# Patient Record
Sex: FEMALE | Race: WHITE | NOT HISPANIC OR LATINO | URBAN - METROPOLITAN AREA
[De-identification: names, ages, dates, MRNs, and addresses within clinical notes are randomized per-mention and may not be internally consistent; named-entity substitution may affect disease eponyms.]

---

## 2018-04-30 ENCOUNTER — AMBULATORY - HEALTHEAST (OUTPATIENT)
Dept: ADMINISTRATIVE | Facility: CLINIC | Age: 83
End: 2018-04-30

## 2018-04-30 ENCOUNTER — OFFICE VISIT - HEALTHEAST (OUTPATIENT)
Dept: GERIATRICS | Facility: CLINIC | Age: 83
End: 2018-04-30

## 2018-04-30 DIAGNOSIS — M43.26 FUSION OF SPINE OF LUMBAR REGION: ICD-10-CM

## 2018-04-30 DIAGNOSIS — M54.16 LUMBAR RADICULOPATHY: ICD-10-CM

## 2018-04-30 DIAGNOSIS — J82.89 PULMONARY EOSINOPHILIA (H): ICD-10-CM

## 2018-04-30 RX ORDER — AMOXICILLIN 250 MG
1-4 CAPSULE ORAL 2 TIMES DAILY
Status: SHIPPED | COMMUNITY
Start: 2018-04-30

## 2018-04-30 RX ORDER — LORATADINE 10 MG/1
10 TABLET ORAL DAILY PRN
Status: SHIPPED | COMMUNITY
Start: 2018-04-30

## 2018-04-30 RX ORDER — HYDROCODONE BITARTRATE AND ACETAMINOPHEN 5; 325 MG/1; MG/1
1-2 TABLET ORAL EVERY 4 HOURS PRN
Status: SHIPPED | COMMUNITY
Start: 2018-04-30

## 2018-04-30 RX ORDER — ACETAMINOPHEN 325 MG/1
325-650 TABLET ORAL EVERY 4 HOURS PRN
Status: SHIPPED | COMMUNITY
Start: 2018-04-30

## 2018-05-03 ENCOUNTER — OFFICE VISIT - HEALTHEAST (OUTPATIENT)
Dept: GERIATRICS | Facility: CLINIC | Age: 83
End: 2018-05-03

## 2018-05-03 DIAGNOSIS — H40.9 GLAUCOMA: ICD-10-CM

## 2018-05-03 DIAGNOSIS — G62.9 PERIPHERAL NEUROPATHY: ICD-10-CM

## 2018-05-03 DIAGNOSIS — Z98.1 S/P LUMBAR FUSION: ICD-10-CM

## 2018-05-03 DIAGNOSIS — K21.9 GERD (GASTROESOPHAGEAL REFLUX DISEASE): ICD-10-CM

## 2018-05-07 ENCOUNTER — AMBULATORY - HEALTHEAST (OUTPATIENT)
Dept: GERIATRICS | Facility: CLINIC | Age: 83
End: 2018-05-07

## 2021-05-05 ENCOUNTER — RECORDS - HEALTHEAST (OUTPATIENT)
Dept: LAB | Facility: CLINIC | Age: 86
End: 2021-05-05

## 2021-05-05 LAB
ERYTHROCYTE [DISTWIDTH] IN BLOOD BY AUTOMATED COUNT: 13.2 % (ref 11–14.5)
HCT VFR BLD AUTO: 28.3 % (ref 35–47)
HGB BLD-MCNC: 9.2 G/DL (ref 12–16)
MCH RBC QN AUTO: 28.8 PG (ref 27–34)
MCHC RBC AUTO-ENTMCNC: 32.5 G/DL (ref 32–36)
MCV RBC AUTO: 89 FL (ref 80–100)
PLATELET # BLD AUTO: 416 THOU/UL (ref 140–440)
PMV BLD AUTO: 10.4 FL (ref 8.5–12.5)
RBC # BLD AUTO: 3.19 MILL/UL (ref 3.8–5.4)
WBC: 11.4 THOU/UL (ref 4–11)

## 2021-06-17 NOTE — PROGRESS NOTES
[facility]      Facility:    [facility] [[id number]]  Code Status: FULL CODE      Chief Complaint/Reason for Visit:  Chief Complaint   Patient presents with     H & P       HPI:   Ana is a 82 y.o. female who recently has undergone lumbar spine surgery involving L4-5 and L5-S1 including removal of some of the previous fusion components and replacing some screws as well.  She has been placed on a very restrictive activity level of no lifting greater than 5 pounds and to avoid any bending stooping or twisting motions of her body.  Her temperature spiked 1 day to 101.1 in the hospital.  She does have a history of pulmonary eosinophilia, but states that it is not been giving her any problems lately and she has not been following with a pulmonologist about this.  She does have a history of positive mental and so a chest x-ray will be done for screening upon admission.    Upon current review of systems, she does not have any fevers or chills, she does not have any sore throat or nasal congestion, no cough or shortness of breath or chest pain or abdominal pain or nausea or bowel problems and no dysuria.  No headache.    Past Medical History:  Past Medical History:   Diagnosis Date     Asthma      Chronic sinusitis      GERD (gastroesophageal reflux disease)      Glaucoma      HTN (hypertension)      Hypercholesteremia      Hyperlipemia      Iron deficiency anemia      LVH (left ventricular hypertrophy)      Major depression      Mastoiditis      Orthostatic hypotension      Osteoarthrosis      Peripheral neuropathy      Pulmonary eosinophilia      PVC (premature ventricular contraction)      Sleep apnea      Subluxation     of L4-L5 lumbar vertebra     Syncope and collapse      Vasovagal syncope            Surgical History:  Past Surgical History:   Procedure Laterality Date     anal spincterotomy  1996     APPENDECTOMY       BREAST SURGERY Left     lumpectomy     CATARACT  EXTRACTION Bilateral     1997 & 2005     colonoscopy  01/2010    diagnostoc, Polyp-f/up conoscopy rec in 5 years     hammer toe repair  Left 2001     HYSTERECTOMY       LAPAROSCOPIC CHOLECYSTECTOMY  10/24/2016     left foot Left 10/2012    remove hardware     LUMBAR FUSION  02/2015     ORIF FOOT FRACTURE Left     5th metatarsal     PICC  2/5/2015          Right L5-S1 transforaminal interbody fusion removal & replacement of screws at L4-L5       SINUS SURGERY  1988     TRABECULECTOMY Bilateral     2005 & 2010 bilateral- for glaucoma       Family History:   Family History   Problem Relation Age of Onset     Cancer Mother      Alcohol abuse Father      Drug abuse Father      Heart disease Father      Alcohol abuse Sister      Drug abuse Sister      Diabetes Sister      Asthma Sister      Alcohol abuse Brother      Drug abuse Brother      Cancer Maternal Grandfather      Stroke Paternal Grandmother      Asthma Child      Cancer Maternal Aunt        Social History:    Social History     Social History     Marital status:      Spouse name: N/A     Number of children: N/A     Years of education: N/A     Social History Main Topics     Smoking status: Never Smoker     Smokeless tobacco: Never Used     Alcohol use Yes     Drug use: Not on file     Sexual activity: Not on file     Other Topics Concern     Not on file     Social History Narrative          Review of Systems   All other systems reviewed and are negative.      Vitals:    04/30/18 0610   BP: 129/78   Pulse: 87   Resp: 16   Temp: 98.1  F (36.7  C)   SpO2: 97%       Physical Exam   Constitutional: She is oriented to person, place, and time. No distress.   HENT:   Mouth/Throat: Oropharynx is clear and moist.   Eyes: Right eye exhibits no discharge. Left eye exhibits no discharge.   Neck: No thyromegaly present.   Cardiovascular: Normal rate, regular rhythm and normal heart sounds.    Pulmonary/Chest: Breath sounds normal. No respiratory distress. She has no  wheezes.   Abdominal: Soft. Bowel sounds are normal. She exhibits no distension. There is no tenderness.   Musculoskeletal: She exhibits no edema.   Lymphadenopathy:     She has no cervical adenopathy.   Neurological: She is alert and oriented to person, place, and time.   Skin: Skin is warm and dry.   Psychiatric: She has a normal mood and affect. Her behavior is normal.   Nursing note and vitals reviewed.      Medication List:  Current Outpatient Prescriptions   Medication Sig     acetaminophen (TYLENOL) 325 MG tablet Take 325-650 mg by mouth every 4 (four) hours as needed for pain.     albuterol (PROVENTIL HFA;VENTOLIN HFA) 90 mcg/actuation inhaler Inhale 2 puffs 4 (four) times a day as needed for wheezing.      amLODIPine (NORVASC) 10 MG tablet Take 5 mg by mouth daily.      bimatoprost (LUMIGAN) 0.01 % Drop Administer 1 drop to both eyes bedtime.      bisacodyl (DULCOLAX) 10 mg suppository Insert 10 mg into the rectum daily as needed.     budesonide-formoterol (SYMBICORT) 160-4.5 mcg/actuation inhaler Inhale 2 puffs 2 (two) times a day.     gabapentin (NEURONTIN) 300 MG capsule Take 900 mg by mouth at bedtime.      gabapentin (NEURONTIN) 300 MG capsule Take 300 mg by mouth daily with supper.      HYDROcodone-acetaminophen 5-325 mg per tablet Take 1-2 tablets by mouth every 4 (four) hours as needed for pain.     loratadine (CLARITIN) 10 mg tablet Take 10 mg by mouth daily as needed for allergies.     mometasone (NASONEX) 50 mcg/actuation nasal spray 2 sprays into each nostril daily.      pantoprazole (PROTONIX) 40 MG tablet Take 40 mg by mouth 2 (two) times a day.     ranitidine (ZANTAC) 300 MG tablet Take 300 mg by mouth bedtime.     senna-docusate (SENEXON-S) 8.6-50 mg tablet Take 1-4 tablets by mouth 2 (two) times a day.       Labs:  No new laboratory testing    Assessment:    ICD-10-CM    1. Fusion of spine of lumbar region M43.26    2. Lumbar radiculopathy M54.16    3. Pulmonary eosinophilia J82         Plan:  Encouragement given for anticipated activities of therapies under the guidance and limitations of her surgeon.      Electronically signed by: Rafael Sandoval MD

## 2021-06-17 NOTE — PROGRESS NOTES
[facility]    Facility:   [facility] [[id number]]   Code Status: FULL CODE  PCP: Araceli Monroy MD   Phone: [phone]   Fax: [phone]      CHIEF COMPLAINT/REASON FOR VISIT:  Chief Complaint   Patient presents with     Discharge Summary       HISTORY COURSE:  Ana is a 82 y.o. female who recently has undergone lumbar spine surgery involving L4-5 and L5-S1 including removal of some of the previous fusion components and replacing some screws as well.  She has been placed on a very restrictive activity level of no lifting greater than 5 pounds and to avoid any bending stooping or twisting motions of her body.  Her temperature spiked 1 day to 101.1 in the hospital.  She does have a history of pulmonary eosinophilia, but states that it is not been giving her any problems lately and she has not been following with a pulmonologist about this.  She does have a history of positive mental and so a chest x-ray will be done for screening upon admission.  She has been doing well from a therapy standpoint at this point she does feel comfortable upon going home and managing her cares.  She at this point will also not receive any extra services.  Her pain is well managed she does take Vicodin as needed usually 3 or 4 doses per day.  She does have sutures in the lumbar area the area actually looks pretty good so we will remove the sutures tomorrow her follow-up with the surgeon apparently is not until a couple more weeks and she is not sure when she will be seeing her family practice doctor for follow-up but she should be following up within a week or so per our recommendation.  Otherwise she has been normotensive and afebrile and also on room air.  No numbness or tingling lower extremities.  Able to be independent and self-sufficient in her room as well as transfers.    Review of Systems  Currently denies chills fever coughing wheezing chest pain dizziness or vertigo nausea  vomiting diarrhea dysuria headache stiff neck or swollen glands.  History of asthma glaucoma hypertension GERD lumbar degenerative disc disease  Vitals:    05/03/18 0925   BP: 129/72   Pulse: 83   Resp: 16   Temp: 98  F (36.7  C)       Physical Exam   Constitutional: She is oriented to person, place, and time. No distress.   HENT:   Head: Normocephalic.   Eyes: Pupils are equal, round, and reactive to light.   Neck: Neck supple. No thyromegaly present.   Cardiovascular: Normal rate, regular rhythm and normal heart sounds.    Pulmonary/Chest: Breath sounds normal.   Abdominal: Bowel sounds are normal. There is no tenderness. There is no guarding.   Musculoskeletal:   History of lumbar degenerative disc disease.  History of L5-S1 transforaminal interbody fusion with removal and replacement screws L4-5   Lymphadenopathy:     She has no cervical adenopathy.   Neurological: She is oriented to person, place, and time.   Skin: Skin is warm and dry. No rash noted.   Psychiatric: Her behavior is normal.     Lab Results   Component Value Date    WBC 7.6 02/08/2015    HGB 8.8 (L) 02/08/2015    HCT 26.5 (L) 02/08/2015    MCV 88 02/08/2015     02/08/2015     Results for orders placed or performed during the hospital encounter of 02/02/15   Basic Metabolic Panel   Result Value Ref Range    Sodium 135 (L) 136 - 145 mmol/L    Potassium 3.6 3.5 - 5.0 mmol/L    Chloride 103 98 - 107 mmol/L    CO2 21 (L) 22 - 31 mmol/L    Anion Gap, Calculation 11 5 - 18 mmol/L    Glucose 89 70 - 125 mg/dL    Calcium 9.0 8.5 - 10.5 mg/dL    BUN 12 8 - 28 mg/dL    Creatinine 0.64 0.60 - 1.10 mg/dL    GFR MDRD Af Amer >60 >60 mL/min/1.73m2    GFR MDRD Non Af Amer >60 >60 mL/min/1.73m2           MEDICATION LIST:  Current Outpatient Prescriptions   Medication Sig     acetaminophen (TYLENOL) 325 MG tablet Take 325-650 mg by mouth every 4 (four) hours as needed for pain.     albuterol (PROVENTIL HFA;VENTOLIN HFA) 90 mcg/actuation inhaler Inhale 2  puffs 4 (four) times a day as needed for wheezing.      amLODIPine (NORVASC) 10 MG tablet Take 5 mg by mouth daily.      bimatoprost (LUMIGAN) 0.01 % Drop Administer 1 drop to both eyes bedtime.      budesonide-formoterol (SYMBICORT) 160-4.5 mcg/actuation inhaler Inhale 2 puffs 2 (two) times a day.     gabapentin (NEURONTIN) 300 MG capsule Take 900 mg by mouth at bedtime.      gabapentin (NEURONTIN) 300 MG capsule Take 300 mg by mouth daily with supper.      HYDROcodone-acetaminophen 5-325 mg per tablet Take 1-2 tablets by mouth every 4 (four) hours as needed for pain.     loratadine (CLARITIN) 10 mg tablet Take 10 mg by mouth daily as needed for allergies.     mometasone (NASONEX) 50 mcg/actuation nasal spray 2 sprays into each nostril daily.      pantoprazole (PROTONIX) 40 MG tablet Take 40 mg by mouth 2 (two) times a day.     ranitidine (ZANTAC) 300 MG tablet Take 300 mg by mouth bedtime.     senna-docusate (SENEXON-S) 8.6-50 mg tablet Take 1-4 tablets by mouth 2 (two) times a day.       DISCHARGE DIAGNOSIS:    ICD-10-CM    1. S/P lumbar fusion Z98.1    2. Peripheral neuropathy G62.9    3. Glaucoma H40.9    4. GERD (gastroesophageal reflux disease) K21.9        MEDICAL EQUIPMENT NEEDS:  Walker    DISCHARGE PLAN/FACE TO FACE:  I certify that services are/were furnished while this patient was under the care of a physician and that a physician or an allowed non-physician practitioner (NPP), had a face-to-face encounter that meets the physician face-to-face encounter requirements. The encounter was in whole, or in part, related to the primary reason for home health. The patient is confined to his/her home and needs intermittent skilled nursing, physical therapy, speech-language pathology, or the continued need for occupational therapy. A plan of care has been established by a physician and is periodically reviewed by a physician.  Date of Face-to-Face Encounter: May 3, 2018    I certify that, based on my findings,  the following services are medically necessary home health services: She will be discharging home with current medications and narcotics and will not receive any specific services upon her discharge date of May 4, 2018    My clinical findings support the need for the above skilled services because: (Please write a brief narrative summary that describes what the RN, PT, SLP, or other services will be doing in the home. A list of diagnoses in this section does not meet the CMS requirements.)  She will not require any skilled services    This patient is homebound because: (Please write a brief narrative summary describing the functional limitations as to why this patient is homebound and specifically what makes this patient homebound.)  She will not be homebound    The patient is, or has been, under my care and I have initiated the establishment of the plan of care. This patient will be followed by a physician who will periodically review the plan of care.    Schedule follow up visit with primary care provider within 7 days to reestablish care.  She will be following up with the surgeon towards the end of May or beginning of June she is not sure of the date yet.  She will follow-up with her primary care doctor regarding medication management future laboratory studies and certainly looking at rechecking her hemoglobin.  We will remove the sutures tomorrow.  She at home does not use a walker here she has been she may want to transition to a walker she is not sure but she also in the past has used a cane but did not care for the cane.  She will consider her options upon discharge.    Discharge coordination care greater than 30 minutes.  Electronically signed by: Michael Duane Johnson, CNP

## 2023-02-08 ENCOUNTER — LAB REQUISITION (OUTPATIENT)
Dept: LAB | Facility: CLINIC | Age: 88
End: 2023-02-08

## 2023-02-08 DIAGNOSIS — I10 ESSENTIAL (PRIMARY) HYPERTENSION: ICD-10-CM

## 2023-02-08 LAB
ANION GAP SERPL CALCULATED.3IONS-SCNC: 22 MMOL/L (ref 7–15)
BUN SERPL-MCNC: 19 MG/DL (ref 8–23)
CALCIUM SERPL-MCNC: 9 MG/DL (ref 8.8–10.2)
CHLORIDE SERPL-SCNC: 105 MMOL/L (ref 98–107)
CREAT SERPL-MCNC: 1.27 MG/DL (ref 0.51–0.95)
DEPRECATED CALCIDIOL+CALCIFEROL SERPL-MC: 29 UG/L (ref 20–75)
DEPRECATED HCO3 PLAS-SCNC: 16 MMOL/L (ref 22–29)
ERYTHROCYTE [DISTWIDTH] IN BLOOD BY AUTOMATED COUNT: 13.7 % (ref 10–15)
GFR SERPL CREATININE-BSD FRML MDRD: 41 ML/MIN/1.73M2
GLUCOSE SERPL-MCNC: 110 MG/DL (ref 70–99)
HCT VFR BLD AUTO: 28.6 % (ref 35–47)
HGB BLD-MCNC: 8.9 G/DL (ref 11.7–15.7)
MCH RBC QN AUTO: 28.2 PG (ref 26.5–33)
MCHC RBC AUTO-ENTMCNC: 31.1 G/DL (ref 31.5–36.5)
MCV RBC AUTO: 91 FL (ref 78–100)
PLATELET # BLD AUTO: 476 10E3/UL (ref 150–450)
POTASSIUM SERPL-SCNC: 5.1 MMOL/L (ref 3.4–5.3)
RBC # BLD AUTO: 3.16 10E6/UL (ref 3.8–5.2)
SODIUM SERPL-SCNC: 143 MMOL/L (ref 136–145)
WBC # BLD AUTO: 10.3 10E3/UL (ref 4–11)

## 2023-02-08 PROCEDURE — 85027 COMPLETE CBC AUTOMATED: CPT | Performed by: NURSE PRACTITIONER

## 2023-02-08 PROCEDURE — 80048 BASIC METABOLIC PNL TOTAL CA: CPT | Performed by: NURSE PRACTITIONER

## 2023-02-08 PROCEDURE — 82306 VITAMIN D 25 HYDROXY: CPT | Performed by: NURSE PRACTITIONER

## 2023-06-01 ENCOUNTER — LAB REQUISITION (OUTPATIENT)
Dept: LAB | Facility: CLINIC | Age: 88
End: 2023-06-01

## 2023-06-01 DIAGNOSIS — E11.9 TYPE 2 DIABETES MELLITUS WITHOUT COMPLICATIONS (H): ICD-10-CM

## 2023-06-01 LAB
ANION GAP SERPL CALCULATED.3IONS-SCNC: 18 MMOL/L (ref 7–15)
BUN SERPL-MCNC: 37.2 MG/DL (ref 8–23)
CALCIUM SERPL-MCNC: 9 MG/DL (ref 8.8–10.2)
CHLORIDE SERPL-SCNC: 106 MMOL/L (ref 98–107)
CREAT SERPL-MCNC: 1.97 MG/DL (ref 0.51–0.95)
DEPRECATED CALCIDIOL+CALCIFEROL SERPL-MC: 35 UG/L (ref 20–75)
DEPRECATED HCO3 PLAS-SCNC: 16 MMOL/L (ref 22–29)
ERYTHROCYTE [DISTWIDTH] IN BLOOD BY AUTOMATED COUNT: 14.6 % (ref 10–15)
GFR SERPL CREATININE-BSD FRML MDRD: 24 ML/MIN/1.73M2
GLUCOSE SERPL-MCNC: 119 MG/DL (ref 70–99)
HCT VFR BLD AUTO: 29.6 % (ref 35–47)
HGB BLD-MCNC: 9 G/DL (ref 11.7–15.7)
MAGNESIUM SERPL-MCNC: 2.6 MG/DL (ref 1.7–2.3)
MCH RBC QN AUTO: 27.3 PG (ref 26.5–33)
MCHC RBC AUTO-ENTMCNC: 30.4 G/DL (ref 31.5–36.5)
MCV RBC AUTO: 90 FL (ref 78–100)
PLATELET # BLD AUTO: 539 10E3/UL (ref 150–450)
POTASSIUM SERPL-SCNC: 4.3 MMOL/L (ref 3.4–5.3)
RBC # BLD AUTO: 3.3 10E6/UL (ref 3.8–5.2)
SODIUM SERPL-SCNC: 140 MMOL/L (ref 136–145)
TSH SERPL DL<=0.005 MIU/L-ACNC: 1.59 UIU/ML (ref 0.3–4.2)
VIT B12 SERPL-MCNC: 519 PG/ML (ref 232–1245)
WBC # BLD AUTO: 13.4 10E3/UL (ref 4–11)

## 2023-06-01 PROCEDURE — 85027 COMPLETE CBC AUTOMATED: CPT | Performed by: NURSE PRACTITIONER

## 2023-06-01 PROCEDURE — 82607 VITAMIN B-12: CPT | Performed by: NURSE PRACTITIONER

## 2023-06-01 PROCEDURE — 80048 BASIC METABOLIC PNL TOTAL CA: CPT | Performed by: NURSE PRACTITIONER

## 2023-06-01 PROCEDURE — 84443 ASSAY THYROID STIM HORMONE: CPT | Performed by: NURSE PRACTITIONER

## 2023-06-01 PROCEDURE — 83036 HEMOGLOBIN GLYCOSYLATED A1C: CPT | Performed by: NURSE PRACTITIONER

## 2023-06-01 PROCEDURE — 82306 VITAMIN D 25 HYDROXY: CPT | Performed by: NURSE PRACTITIONER

## 2023-06-01 PROCEDURE — 83735 ASSAY OF MAGNESIUM: CPT | Performed by: NURSE PRACTITIONER

## 2023-06-02 ENCOUNTER — LAB REQUISITION (OUTPATIENT)
Dept: LAB | Facility: CLINIC | Age: 88
End: 2023-06-02

## 2023-06-02 DIAGNOSIS — D50.9 IRON DEFICIENCY ANEMIA, UNSPECIFIED: ICD-10-CM

## 2023-06-02 LAB
ALBUMIN UR-MCNC: 50 MG/DL
APPEARANCE UR: ABNORMAL
BACTERIA #/AREA URNS HPF: ABNORMAL /HPF
BILIRUB UR QL STRIP: NEGATIVE
COLOR UR AUTO: YELLOW
GLUCOSE UR STRIP-MCNC: NEGATIVE MG/DL
HBA1C MFR BLD: 7.4 %
HGB UR QL STRIP: NEGATIVE
HYALINE CASTS: 10 /LPF
KETONES UR STRIP-MCNC: NEGATIVE MG/DL
LEUKOCYTE ESTERASE UR QL STRIP: ABNORMAL
MUCOUS THREADS #/AREA URNS LPF: PRESENT /LPF
NITRATE UR QL: NEGATIVE
PH UR STRIP: 5 [PH] (ref 5–7)
RBC URINE: 18 /HPF
SP GR UR STRIP: 1.02 (ref 1–1.03)
SQUAMOUS EPITHELIAL: 48 /HPF
UROBILINOGEN UR STRIP-MCNC: NORMAL MG/DL
WBC CLUMPS #/AREA URNS HPF: PRESENT /HPF
WBC URINE: >182 /HPF

## 2023-06-02 PROCEDURE — 87086 URINE CULTURE/COLONY COUNT: CPT | Performed by: FAMILY MEDICINE

## 2023-06-02 PROCEDURE — 81001 URINALYSIS AUTO W/SCOPE: CPT | Performed by: FAMILY MEDICINE

## 2023-06-03 LAB — BACTERIA UR CULT: NORMAL

## 2023-06-05 ENCOUNTER — LAB REQUISITION (OUTPATIENT)
Dept: LAB | Facility: CLINIC | Age: 88
End: 2023-06-05

## 2023-06-05 DIAGNOSIS — N18.31 CHRONIC KIDNEY DISEASE, STAGE 3A (H): ICD-10-CM

## 2023-06-05 LAB
ANION GAP SERPL CALCULATED.3IONS-SCNC: 17 MMOL/L (ref 7–15)
BUN SERPL-MCNC: 50.9 MG/DL (ref 8–23)
CALCIUM SERPL-MCNC: 9.1 MG/DL (ref 8.8–10.2)
CHLORIDE SERPL-SCNC: 107 MMOL/L (ref 98–107)
CREAT SERPL-MCNC: 2.71 MG/DL (ref 0.51–0.95)
DEPRECATED HCO3 PLAS-SCNC: 15 MMOL/L (ref 22–29)
GFR SERPL CREATININE-BSD FRML MDRD: 17 ML/MIN/1.73M2
GLUCOSE SERPL-MCNC: 112 MG/DL (ref 70–99)
POTASSIUM SERPL-SCNC: 5.2 MMOL/L (ref 3.4–5.3)
SODIUM SERPL-SCNC: 139 MMOL/L (ref 136–145)

## 2023-06-05 PROCEDURE — 80048 BASIC METABOLIC PNL TOTAL CA: CPT | Performed by: FAMILY MEDICINE

## 2023-06-07 ENCOUNTER — LAB REQUISITION (OUTPATIENT)
Dept: LAB | Facility: CLINIC | Age: 88
End: 2023-06-07
Payer: COMMERCIAL

## 2023-06-07 ENCOUNTER — LAB REQUISITION (OUTPATIENT)
Dept: LAB | Facility: CLINIC | Age: 88
End: 2023-06-07

## 2023-06-07 DIAGNOSIS — I50.32 CHRONIC DIASTOLIC (CONGESTIVE) HEART FAILURE (H): ICD-10-CM

## 2023-06-07 DIAGNOSIS — N18.30 CHRONIC KIDNEY DISEASE, STAGE 3 UNSPECIFIED (H): ICD-10-CM

## 2023-06-07 LAB
ANION GAP SERPL CALCULATED.3IONS-SCNC: 16 MMOL/L (ref 7–15)
BUN SERPL-MCNC: 40.8 MG/DL (ref 8–23)
CALCIUM SERPL-MCNC: 8.6 MG/DL (ref 8.8–10.2)
CHLORIDE SERPL-SCNC: 109 MMOL/L (ref 98–107)
CREAT SERPL-MCNC: 2.03 MG/DL (ref 0.51–0.95)
DEPRECATED HCO3 PLAS-SCNC: 16 MMOL/L (ref 22–29)
ERYTHROCYTE [DISTWIDTH] IN BLOOD BY AUTOMATED COUNT: 14.9 % (ref 10–15)
GFR SERPL CREATININE-BSD FRML MDRD: 23 ML/MIN/1.73M2
GLUCOSE SERPL-MCNC: 133 MG/DL (ref 70–99)
HCT VFR BLD AUTO: 27.9 % (ref 35–47)
HGB BLD-MCNC: 8.2 G/DL (ref 11.7–15.7)
MCH RBC QN AUTO: 26.8 PG (ref 26.5–33)
MCHC RBC AUTO-ENTMCNC: 29.4 G/DL (ref 31.5–36.5)
MCV RBC AUTO: 91 FL (ref 78–100)
PLATELET # BLD AUTO: 419 10E3/UL (ref 150–450)
POTASSIUM SERPL-SCNC: 4.8 MMOL/L (ref 3.4–5.3)
RBC # BLD AUTO: 3.06 10E6/UL (ref 3.8–5.2)
SODIUM SERPL-SCNC: 141 MMOL/L (ref 136–145)
WBC # BLD AUTO: 10.5 10E3/UL (ref 4–11)

## 2023-06-07 PROCEDURE — 80048 BASIC METABOLIC PNL TOTAL CA: CPT | Performed by: FAMILY MEDICINE

## 2023-06-07 PROCEDURE — 85027 COMPLETE CBC AUTOMATED: CPT | Performed by: FAMILY MEDICINE

## 2023-06-09 ENCOUNTER — LAB REQUISITION (OUTPATIENT)
Dept: LAB | Facility: CLINIC | Age: 88
End: 2023-06-09

## 2023-06-09 DIAGNOSIS — E86.0 DEHYDRATION: ICD-10-CM

## 2023-06-09 LAB
ANION GAP SERPL CALCULATED.3IONS-SCNC: 15 MMOL/L (ref 7–15)
BUN SERPL-MCNC: 25.8 MG/DL (ref 8–23)
CALCIUM SERPL-MCNC: 8.5 MG/DL (ref 8.8–10.2)
CHLORIDE SERPL-SCNC: 113 MMOL/L (ref 98–107)
CREAT SERPL-MCNC: 1.49 MG/DL (ref 0.51–0.95)
DEPRECATED HCO3 PLAS-SCNC: 13 MMOL/L (ref 22–29)
GFR SERPL CREATININE-BSD FRML MDRD: 34 ML/MIN/1.73M2
GLUCOSE SERPL-MCNC: 106 MG/DL (ref 70–99)
POTASSIUM SERPL-SCNC: 4.2 MMOL/L (ref 3.4–5.3)
SODIUM SERPL-SCNC: 141 MMOL/L (ref 136–145)

## 2023-06-09 PROCEDURE — 80048 BASIC METABOLIC PNL TOTAL CA: CPT | Performed by: NURSE PRACTITIONER

## 2023-06-12 ENCOUNTER — LAB REQUISITION (OUTPATIENT)
Dept: LAB | Facility: CLINIC | Age: 88
End: 2023-06-12
Payer: COMMERCIAL

## 2023-06-12 DIAGNOSIS — F03.90 UNSPECIFIED DEMENTIA, UNSPECIFIED SEVERITY, WITHOUT BEHAVIORAL DISTURBANCE, PSYCHOTIC DISTURBANCE, MOOD DISTURBANCE, AND ANXIETY (H): ICD-10-CM

## 2023-06-12 DIAGNOSIS — I10 ESSENTIAL (PRIMARY) HYPERTENSION: ICD-10-CM

## 2023-06-13 LAB
ANION GAP SERPL CALCULATED.3IONS-SCNC: 10 MMOL/L (ref 7–15)
BUN SERPL-MCNC: 16 MG/DL (ref 8–23)
CALCIUM SERPL-MCNC: 8.6 MG/DL (ref 8.8–10.2)
CHLORIDE SERPL-SCNC: 116 MMOL/L (ref 98–107)
CREAT SERPL-MCNC: 1.33 MG/DL (ref 0.51–0.95)
DEPRECATED HCO3 PLAS-SCNC: 18 MMOL/L (ref 22–29)
GFR SERPL CREATININE-BSD FRML MDRD: 39 ML/MIN/1.73M2
GLUCOSE SERPL-MCNC: 96 MG/DL (ref 70–99)
POTASSIUM SERPL-SCNC: 3.7 MMOL/L (ref 3.4–5.3)
SODIUM SERPL-SCNC: 144 MMOL/L (ref 136–145)

## 2023-06-13 PROCEDURE — P9603 ONE-WAY ALLOW PRORATED MILES: HCPCS | Mod: ORL | Performed by: PHYSICIAN ASSISTANT

## 2023-06-13 PROCEDURE — 36415 COLL VENOUS BLD VENIPUNCTURE: CPT | Mod: ORL | Performed by: PHYSICIAN ASSISTANT

## 2023-06-13 PROCEDURE — 80048 BASIC METABOLIC PNL TOTAL CA: CPT | Mod: ORL | Performed by: PHYSICIAN ASSISTANT

## 2023-09-29 ENCOUNTER — LAB REQUISITION (OUTPATIENT)
Dept: LAB | Facility: CLINIC | Age: 88
End: 2023-09-29

## 2023-09-29 DIAGNOSIS — D50.9 IRON DEFICIENCY ANEMIA, UNSPECIFIED: ICD-10-CM

## 2023-09-29 LAB
ANION GAP SERPL CALCULATED.3IONS-SCNC: 13 MMOL/L (ref 7–15)
BUN SERPL-MCNC: 30.9 MG/DL (ref 8–23)
CALCIUM SERPL-MCNC: 8.7 MG/DL (ref 8.8–10.2)
CHLORIDE SERPL-SCNC: 105 MMOL/L (ref 98–107)
CREAT SERPL-MCNC: 1.25 MG/DL (ref 0.51–0.95)
DEPRECATED HCO3 PLAS-SCNC: 20 MMOL/L (ref 22–29)
EGFRCR SERPLBLD CKD-EPI 2021: 42 ML/MIN/1.73M2
GLUCOSE SERPL-MCNC: 285 MG/DL (ref 70–99)
HGB BLD-MCNC: 7.8 G/DL (ref 11.7–15.7)
PLATELET # BLD AUTO: 527 10E3/UL (ref 150–450)
POTASSIUM SERPL-SCNC: 4.3 MMOL/L (ref 3.4–5.3)
SODIUM SERPL-SCNC: 138 MMOL/L (ref 135–145)

## 2023-09-29 PROCEDURE — 85049 AUTOMATED PLATELET COUNT: CPT | Performed by: FAMILY MEDICINE

## 2023-09-29 PROCEDURE — 80048 BASIC METABOLIC PNL TOTAL CA: CPT | Performed by: FAMILY MEDICINE

## 2023-09-29 PROCEDURE — 85018 HEMOGLOBIN: CPT | Performed by: FAMILY MEDICINE

## 2023-10-04 ENCOUNTER — LAB REQUISITION (OUTPATIENT)
Dept: LAB | Facility: CLINIC | Age: 88
End: 2023-10-04

## 2023-10-04 DIAGNOSIS — I10 ESSENTIAL (PRIMARY) HYPERTENSION: ICD-10-CM

## 2023-10-04 DIAGNOSIS — D55.9 ANEMIA DUE TO ENZYME DISORDER, UNSPECIFIED (H): ICD-10-CM

## 2023-10-04 LAB
ANION GAP SERPL CALCULATED.3IONS-SCNC: 16 MMOL/L (ref 7–15)
BASO+EOS+MONOS # BLD AUTO: ABNORMAL 10*3/UL
BASO+EOS+MONOS NFR BLD AUTO: ABNORMAL %
BASOPHILS # BLD AUTO: 0 10E3/UL (ref 0–0.2)
BASOPHILS NFR BLD AUTO: 0 %
BUN SERPL-MCNC: 26.3 MG/DL (ref 8–23)
CALCIUM SERPL-MCNC: 8.8 MG/DL (ref 8.8–10.2)
CHLORIDE SERPL-SCNC: 97 MMOL/L (ref 98–107)
CREAT SERPL-MCNC: 1.15 MG/DL (ref 0.51–0.95)
DEPRECATED HCO3 PLAS-SCNC: 21 MMOL/L (ref 22–29)
EGFRCR SERPLBLD CKD-EPI 2021: 46 ML/MIN/1.73M2
EOSINOPHIL # BLD AUTO: 0.2 10E3/UL (ref 0–0.7)
EOSINOPHIL NFR BLD AUTO: 1 %
ERYTHROCYTE [DISTWIDTH] IN BLOOD BY AUTOMATED COUNT: 16.9 % (ref 10–15)
GLUCOSE SERPL-MCNC: 339 MG/DL (ref 70–99)
HCT VFR BLD AUTO: 29 % (ref 35–47)
HGB BLD-MCNC: 8.8 G/DL (ref 11.7–15.7)
IMM GRANULOCYTES # BLD: 0.1 10E3/UL
IMM GRANULOCYTES NFR BLD: 1 %
LYMPHOCYTES # BLD AUTO: 2.3 10E3/UL (ref 0.8–5.3)
LYMPHOCYTES NFR BLD AUTO: 17 %
MCH RBC QN AUTO: 26.5 PG (ref 26.5–33)
MCHC RBC AUTO-ENTMCNC: 30.3 G/DL (ref 31.5–36.5)
MCV RBC AUTO: 87 FL (ref 78–100)
MONOCYTES # BLD AUTO: 1 10E3/UL (ref 0–1.3)
MONOCYTES NFR BLD AUTO: 8 %
NEUTROPHILS # BLD AUTO: 9.6 10E3/UL (ref 1.6–8.3)
NEUTROPHILS NFR BLD AUTO: 73 %
NRBC # BLD AUTO: 0 10E3/UL
NRBC BLD AUTO-RTO: 0 /100
PLATELET # BLD AUTO: 438 10E3/UL (ref 150–450)
POTASSIUM SERPL-SCNC: 4.7 MMOL/L (ref 3.4–5.3)
RBC # BLD AUTO: 3.32 10E6/UL (ref 3.8–5.2)
SODIUM SERPL-SCNC: 134 MMOL/L (ref 135–145)
VIT D+METAB SERPL-MCNC: 25 NG/ML (ref 20–50)
WBC # BLD AUTO: 13.2 10E3/UL (ref 4–11)

## 2023-10-04 PROCEDURE — 82306 VITAMIN D 25 HYDROXY: CPT | Performed by: NURSE PRACTITIONER

## 2023-10-04 PROCEDURE — 80048 BASIC METABOLIC PNL TOTAL CA: CPT | Performed by: NURSE PRACTITIONER

## 2023-10-04 PROCEDURE — 85025 COMPLETE CBC W/AUTO DIFF WBC: CPT | Performed by: NURSE PRACTITIONER

## 2023-10-06 ENCOUNTER — LAB REQUISITION (OUTPATIENT)
Dept: LAB | Facility: CLINIC | Age: 88
End: 2023-10-06

## 2023-10-06 DIAGNOSIS — N39.0 URINARY TRACT INFECTION, SITE NOT SPECIFIED: ICD-10-CM

## 2023-10-06 LAB
ALBUMIN UR-MCNC: 10 MG/DL
APPEARANCE UR: ABNORMAL
BACTERIA #/AREA URNS HPF: ABNORMAL /HPF
BILIRUB UR QL STRIP: NEGATIVE
COLOR UR AUTO: ABNORMAL
GLUCOSE UR STRIP-MCNC: 200 MG/DL
HGB UR QL STRIP: ABNORMAL
KETONES UR STRIP-MCNC: NEGATIVE MG/DL
LEUKOCYTE ESTERASE UR QL STRIP: ABNORMAL
NITRATE UR QL: NEGATIVE
PH UR STRIP: 7 [PH] (ref 5–7)
RBC URINE: 0 /HPF
SP GR UR STRIP: 1.01 (ref 1–1.03)
SQUAMOUS EPITHELIAL: 1 /HPF
UROBILINOGEN UR STRIP-MCNC: NORMAL MG/DL
WBC CLUMPS #/AREA URNS HPF: PRESENT /HPF
WBC URINE: >182 /HPF
YEAST #/AREA URNS HPF: ABNORMAL /HPF

## 2023-10-06 PROCEDURE — 81001 URINALYSIS AUTO W/SCOPE: CPT | Performed by: FAMILY MEDICINE

## 2023-10-06 PROCEDURE — 87086 URINE CULTURE/COLONY COUNT: CPT | Performed by: FAMILY MEDICINE

## 2023-10-07 LAB — BACTERIA UR CULT: NORMAL

## 2023-10-09 ENCOUNTER — LAB REQUISITION (OUTPATIENT)
Dept: LAB | Facility: CLINIC | Age: 88
End: 2023-10-09
Payer: COMMERCIAL

## 2023-10-09 DIAGNOSIS — I10 ESSENTIAL (PRIMARY) HYPERTENSION: ICD-10-CM

## 2023-10-09 DIAGNOSIS — D55.9 ANEMIA DUE TO ENZYME DISORDER, UNSPECIFIED (H): ICD-10-CM

## 2023-10-09 DIAGNOSIS — F03.90 UNSPECIFIED DEMENTIA, UNSPECIFIED SEVERITY, WITHOUT BEHAVIORAL DISTURBANCE, PSYCHOTIC DISTURBANCE, MOOD DISTURBANCE, AND ANXIETY (H): ICD-10-CM

## 2023-10-16 ENCOUNTER — LAB REQUISITION (OUTPATIENT)
Dept: LAB | Facility: CLINIC | Age: 88
End: 2023-10-16
Payer: COMMERCIAL

## 2023-10-16 DIAGNOSIS — I10 ESSENTIAL (PRIMARY) HYPERTENSION: ICD-10-CM

## 2023-10-16 DIAGNOSIS — D55.9 ANEMIA DUE TO ENZYME DISORDER, UNSPECIFIED (H): ICD-10-CM

## 2023-10-16 DIAGNOSIS — F03.90 UNSPECIFIED DEMENTIA, UNSPECIFIED SEVERITY, WITHOUT BEHAVIORAL DISTURBANCE, PSYCHOTIC DISTURBANCE, MOOD DISTURBANCE, AND ANXIETY (H): ICD-10-CM

## 2023-10-16 DIAGNOSIS — M25.50 PAIN IN UNSPECIFIED JOINT: ICD-10-CM

## 2023-10-17 LAB
ANION GAP SERPL CALCULATED.3IONS-SCNC: 14 MMOL/L (ref 7–15)
BUN SERPL-MCNC: 28.2 MG/DL (ref 8–23)
CALCIUM SERPL-MCNC: 9 MG/DL (ref 8.8–10.2)
CHLORIDE SERPL-SCNC: 96 MMOL/L (ref 98–107)
CREAT SERPL-MCNC: 1.21 MG/DL (ref 0.51–0.95)
DEPRECATED HCO3 PLAS-SCNC: 23 MMOL/L (ref 22–29)
EGFRCR SERPLBLD CKD-EPI 2021: 43 ML/MIN/1.73M2
GLUCOSE SERPL-MCNC: 414 MG/DL (ref 70–99)
HGB BLD-MCNC: 8.3 G/DL (ref 11.7–15.7)
PLATELET # BLD AUTO: 387 10E3/UL (ref 150–450)
POTASSIUM SERPL-SCNC: 3.7 MMOL/L (ref 3.4–5.3)
SODIUM SERPL-SCNC: 133 MMOL/L (ref 135–145)
URATE SERPL-MCNC: 5.2 MG/DL (ref 2.4–5.7)

## 2023-10-17 PROCEDURE — P9604 ONE-WAY ALLOW PRORATED TRIP: HCPCS | Mod: ORL | Performed by: PHYSICIAN ASSISTANT

## 2023-10-17 PROCEDURE — 84550 ASSAY OF BLOOD/URIC ACID: CPT | Mod: ORL | Performed by: PHYSICIAN ASSISTANT

## 2023-10-17 PROCEDURE — 85049 AUTOMATED PLATELET COUNT: CPT | Mod: ORL | Performed by: PHYSICIAN ASSISTANT

## 2023-10-17 PROCEDURE — 80048 BASIC METABOLIC PNL TOTAL CA: CPT | Mod: ORL | Performed by: PHYSICIAN ASSISTANT

## 2023-10-17 PROCEDURE — 85018 HEMOGLOBIN: CPT | Mod: ORL | Performed by: PHYSICIAN ASSISTANT

## 2023-10-17 PROCEDURE — 36415 COLL VENOUS BLD VENIPUNCTURE: CPT | Mod: ORL | Performed by: PHYSICIAN ASSISTANT

## 2024-09-26 ENCOUNTER — LAB REQUISITION (OUTPATIENT)
Dept: LAB | Facility: CLINIC | Age: 89
End: 2024-09-26
Payer: COMMERCIAL

## 2024-09-26 DIAGNOSIS — E11.22 TYPE 2 DIABETES MELLITUS WITH DIABETIC CHRONIC KIDNEY DISEASE (H): ICD-10-CM

## 2024-09-26 DIAGNOSIS — I50.9 HEART FAILURE, UNSPECIFIED (H): ICD-10-CM

## 2024-09-28 ENCOUNTER — LAB REQUISITION (OUTPATIENT)
Dept: LAB | Facility: CLINIC | Age: 89
End: 2024-09-28
Payer: COMMERCIAL

## 2024-09-28 DIAGNOSIS — E11.22 TYPE 2 DIABETES MELLITUS WITH DIABETIC CHRONIC KIDNEY DISEASE (H): ICD-10-CM

## 2024-09-28 DIAGNOSIS — I50.9 HEART FAILURE, UNSPECIFIED (H): ICD-10-CM

## 2024-10-01 LAB
ALBUMIN SERPL BCG-MCNC: 3.9 G/DL (ref 3.5–5.2)
ALP SERPL-CCNC: 90 U/L (ref 40–150)
ALT SERPL W P-5'-P-CCNC: <5 U/L (ref 0–50)
ANION GAP SERPL CALCULATED.3IONS-SCNC: 12 MMOL/L (ref 7–15)
AST SERPL W P-5'-P-CCNC: 22 U/L (ref 0–45)
BILIRUB SERPL-MCNC: 0.3 MG/DL
BUN SERPL-MCNC: 33.8 MG/DL (ref 8–23)
CALCIUM SERPL-MCNC: 9.3 MG/DL (ref 8.8–10.4)
CHLORIDE SERPL-SCNC: 104 MMOL/L (ref 98–107)
CREAT SERPL-MCNC: 1.59 MG/DL (ref 0.51–0.95)
EGFRCR SERPLBLD CKD-EPI 2021: 31 ML/MIN/1.73M2
ERYTHROCYTE [DISTWIDTH] IN BLOOD BY AUTOMATED COUNT: 12.9 % (ref 10–15)
EST. AVERAGE GLUCOSE BLD GHB EST-MCNC: 160 MG/DL
GLUCOSE SERPL-MCNC: 110 MG/DL (ref 70–99)
HBA1C MFR BLD: 7.2 %
HCO3 SERPL-SCNC: 22 MMOL/L (ref 22–29)
HCT VFR BLD AUTO: 32.2 % (ref 35–47)
HGB BLD-MCNC: 10.1 G/DL (ref 11.7–15.7)
MCH RBC QN AUTO: 29.1 PG (ref 26.5–33)
MCHC RBC AUTO-ENTMCNC: 31.4 G/DL (ref 31.5–36.5)
MCV RBC AUTO: 93 FL (ref 78–100)
PLATELET # BLD AUTO: 366 10E3/UL (ref 150–450)
POTASSIUM SERPL-SCNC: 4.7 MMOL/L (ref 3.4–5.3)
PROT SERPL-MCNC: 7.5 G/DL (ref 6.4–8.3)
RBC # BLD AUTO: 3.47 10E6/UL (ref 3.8–5.2)
SODIUM SERPL-SCNC: 138 MMOL/L (ref 135–145)
TSH SERPL DL<=0.005 MIU/L-ACNC: 2.67 UIU/ML (ref 0.3–4.2)
WBC # BLD AUTO: 12.3 10E3/UL (ref 4–11)

## 2024-10-01 PROCEDURE — 84443 ASSAY THYROID STIM HORMONE: CPT | Mod: ORL | Performed by: NURSE PRACTITIONER

## 2024-10-01 PROCEDURE — 80053 COMPREHEN METABOLIC PANEL: CPT | Mod: ORL | Performed by: NURSE PRACTITIONER

## 2024-10-01 PROCEDURE — P9603 ONE-WAY ALLOW PRORATED MILES: HCPCS | Mod: ORL | Performed by: NURSE PRACTITIONER

## 2024-10-01 PROCEDURE — 36415 COLL VENOUS BLD VENIPUNCTURE: CPT | Mod: ORL | Performed by: NURSE PRACTITIONER

## 2024-10-01 PROCEDURE — 83036 HEMOGLOBIN GLYCOSYLATED A1C: CPT | Mod: ORL | Performed by: NURSE PRACTITIONER

## 2024-10-01 PROCEDURE — 85027 COMPLETE CBC AUTOMATED: CPT | Mod: ORL | Performed by: NURSE PRACTITIONER

## 2025-02-11 ENCOUNTER — LAB REQUISITION (OUTPATIENT)
Dept: LAB | Facility: CLINIC | Age: 89
End: 2025-02-11
Payer: COMMERCIAL

## 2025-02-11 DIAGNOSIS — R30.0 DYSURIA: ICD-10-CM

## 2025-02-11 LAB
ALBUMIN UR-MCNC: NEGATIVE MG/DL
APPEARANCE UR: CLEAR
BILIRUB UR QL STRIP: NEGATIVE
COLOR UR AUTO: ABNORMAL
GLUCOSE UR STRIP-MCNC: 1000 MG/DL
HGB UR QL STRIP: NEGATIVE
KETONES UR STRIP-MCNC: NEGATIVE MG/DL
LEUKOCYTE ESTERASE UR QL STRIP: ABNORMAL
NITRATE UR QL: NEGATIVE
PH UR STRIP: 6.5 [PH] (ref 5–7)
RBC URINE: 0 /HPF
SP GR UR STRIP: 1.01 (ref 1–1.03)
SQUAMOUS EPITHELIAL: 4 /HPF
TRANSITIONAL EPI: <1 /HPF
UROBILINOGEN UR STRIP-MCNC: NORMAL MG/DL
WBC URINE: 5 /HPF

## 2025-02-12 LAB — BACTERIA UR CULT: NORMAL

## 2025-02-24 ENCOUNTER — LAB REQUISITION (OUTPATIENT)
Dept: LAB | Facility: CLINIC | Age: 89
End: 2025-02-24
Payer: COMMERCIAL

## 2025-02-24 DIAGNOSIS — E11.22 TYPE 2 DIABETES MELLITUS WITH DIABETIC CHRONIC KIDNEY DISEASE (H): ICD-10-CM

## 2025-02-24 PROCEDURE — 36415 COLL VENOUS BLD VENIPUNCTURE: CPT | Mod: ORL | Performed by: NURSE PRACTITIONER

## 2025-02-24 PROCEDURE — 80048 BASIC METABOLIC PNL TOTAL CA: CPT | Mod: ORL | Performed by: NURSE PRACTITIONER

## 2025-02-24 PROCEDURE — 83036 HEMOGLOBIN GLYCOSYLATED A1C: CPT | Mod: ORL | Performed by: NURSE PRACTITIONER

## 2025-02-24 PROCEDURE — P9604 ONE-WAY ALLOW PRORATED TRIP: HCPCS | Mod: ORL | Performed by: NURSE PRACTITIONER

## 2025-02-25 LAB
EST. AVERAGE GLUCOSE BLD GHB EST-MCNC: 315 MG/DL
EST. AVERAGE GLUCOSE BLD GHB EST-MCNC: 315 MG/DL
HBA1C MFR BLD: 12.6 %
HBA1C MFR BLD: 12.6 %

## 2025-02-26 LAB
ANION GAP SERPL CALCULATED.3IONS-SCNC: 17 MMOL/L (ref 7–15)
ANION GAP SERPL CALCULATED.3IONS-SCNC: 17 MMOL/L (ref 7–15)
BUN SERPL-MCNC: 20.1 MG/DL (ref 8–23)
BUN SERPL-MCNC: 20.1 MG/DL (ref 8–23)
CALCIUM SERPL-MCNC: 9.7 MG/DL (ref 8.8–10.4)
CALCIUM SERPL-MCNC: 9.7 MG/DL (ref 8.8–10.4)
CHLORIDE SERPL-SCNC: 96 MMOL/L (ref 98–107)
CHLORIDE SERPL-SCNC: 96 MMOL/L (ref 98–107)
CREAT SERPL-MCNC: 1.44 MG/DL (ref 0.51–0.95)
CREAT SERPL-MCNC: 1.44 MG/DL (ref 0.51–0.95)
EGFRCR SERPLBLD CKD-EPI 2021: 35 ML/MIN/1.73M2
EGFRCR SERPLBLD CKD-EPI 2021: 35 ML/MIN/1.73M2
GLUCOSE SERPL-MCNC: 337 MG/DL (ref 70–99)
GLUCOSE SERPL-MCNC: 337 MG/DL (ref 70–99)
HCO3 SERPL-SCNC: 20 MMOL/L (ref 22–29)
HCO3 SERPL-SCNC: 20 MMOL/L (ref 22–29)
POTASSIUM SERPL-SCNC: 4.6 MMOL/L (ref 3.4–5.3)
POTASSIUM SERPL-SCNC: 4.6 MMOL/L (ref 3.4–5.3)
SODIUM SERPL-SCNC: 133 MMOL/L (ref 135–145)
SODIUM SERPL-SCNC: 133 MMOL/L (ref 135–145)

## 2025-04-11 ENCOUNTER — LAB REQUISITION (OUTPATIENT)
Dept: LAB | Facility: CLINIC | Age: 89
End: 2025-04-11
Payer: COMMERCIAL

## 2025-04-11 DIAGNOSIS — I50.9 HEART FAILURE, UNSPECIFIED (H): ICD-10-CM

## 2025-04-15 LAB
ALBUMIN SERPL BCG-MCNC: 4.1 G/DL (ref 3.5–5.2)
ALP SERPL-CCNC: 113 U/L (ref 40–150)
ALT SERPL W P-5'-P-CCNC: <5 U/L (ref 0–50)
ANION GAP SERPL CALCULATED.3IONS-SCNC: 14 MMOL/L (ref 7–15)
AST SERPL W P-5'-P-CCNC: 19 U/L (ref 0–45)
BILIRUB SERPL-MCNC: 0.3 MG/DL
BUN SERPL-MCNC: 33.2 MG/DL (ref 8–23)
CALCIUM SERPL-MCNC: 9.6 MG/DL (ref 8.8–10.4)
CHLORIDE SERPL-SCNC: 100 MMOL/L (ref 98–107)
CREAT SERPL-MCNC: 1.56 MG/DL (ref 0.51–0.95)
EGFRCR SERPLBLD CKD-EPI 2021: 32 ML/MIN/1.73M2
ERYTHROCYTE [DISTWIDTH] IN BLOOD BY AUTOMATED COUNT: 12.9 % (ref 10–15)
GLUCOSE SERPL-MCNC: 169 MG/DL (ref 70–99)
HCO3 SERPL-SCNC: 21 MMOL/L (ref 22–29)
HCT VFR BLD AUTO: 35.5 % (ref 35–47)
HGB BLD-MCNC: 10.7 G/DL (ref 11.7–15.7)
MCH RBC QN AUTO: 27.5 PG (ref 26.5–33)
MCHC RBC AUTO-ENTMCNC: 30.1 G/DL (ref 31.5–36.5)
MCV RBC AUTO: 91 FL (ref 78–100)
PLATELET # BLD AUTO: 436 10E3/UL (ref 150–450)
POTASSIUM SERPL-SCNC: 4.5 MMOL/L (ref 3.4–5.3)
PROT SERPL-MCNC: 8.3 G/DL (ref 6.4–8.3)
RBC # BLD AUTO: 3.89 10E6/UL (ref 3.8–5.2)
SODIUM SERPL-SCNC: 135 MMOL/L (ref 135–145)
WBC # BLD AUTO: 12.3 10E3/UL (ref 4–11)

## 2025-06-04 ENCOUNTER — LAB REQUISITION (OUTPATIENT)
Dept: LAB | Facility: CLINIC | Age: 89
End: 2025-06-04
Payer: COMMERCIAL

## 2025-06-04 DIAGNOSIS — E11.22 TYPE 2 DIABETES MELLITUS WITH DIABETIC CHRONIC KIDNEY DISEASE (H): ICD-10-CM

## 2025-06-10 LAB
EST. AVERAGE GLUCOSE BLD GHB EST-MCNC: 180 MG/DL
HBA1C MFR BLD: 7.9 %

## 2025-06-10 PROCEDURE — 83036 HEMOGLOBIN GLYCOSYLATED A1C: CPT | Mod: ORL | Performed by: NURSE PRACTITIONER

## 2025-06-10 PROCEDURE — 36415 COLL VENOUS BLD VENIPUNCTURE: CPT | Mod: ORL | Performed by: NURSE PRACTITIONER

## 2025-06-10 PROCEDURE — P9604 ONE-WAY ALLOW PRORATED TRIP: HCPCS | Mod: ORL | Performed by: NURSE PRACTITIONER
